# Patient Record
Sex: MALE | Race: BLACK OR AFRICAN AMERICAN | NOT HISPANIC OR LATINO
[De-identification: names, ages, dates, MRNs, and addresses within clinical notes are randomized per-mention and may not be internally consistent; named-entity substitution may affect disease eponyms.]

---

## 2017-03-30 ENCOUNTER — APPOINTMENT (OUTPATIENT)
Dept: HEART AND VASCULAR | Facility: CLINIC | Age: 67
End: 2017-03-30

## 2017-03-30 PROBLEM — Z00.00 ENCOUNTER FOR PREVENTIVE HEALTH EXAMINATION: Status: ACTIVE | Noted: 2017-03-30

## 2019-10-22 ENCOUNTER — APPOINTMENT (OUTPATIENT)
Dept: VASCULAR SURGERY | Facility: CLINIC | Age: 69
End: 2019-10-22
Payer: MEDICARE

## 2019-10-22 DIAGNOSIS — Z78.9 OTHER SPECIFIED HEALTH STATUS: ICD-10-CM

## 2019-10-22 DIAGNOSIS — Z87.891 PERSONAL HISTORY OF NICOTINE DEPENDENCE: ICD-10-CM

## 2019-10-22 DIAGNOSIS — I10 ESSENTIAL (PRIMARY) HYPERTENSION: ICD-10-CM

## 2019-10-22 DIAGNOSIS — K21.9 GASTRO-ESOPHAGEAL REFLUX DISEASE W/OUT ESOPHAGITIS: ICD-10-CM

## 2019-10-22 DIAGNOSIS — E11.21 TYPE 2 DIABETES MELLITUS WITH DIABETIC NEPHROPATHY: ICD-10-CM

## 2019-10-22 PROCEDURE — G0365: CPT

## 2019-10-22 PROCEDURE — 99204 OFFICE O/P NEW MOD 45 MIN: CPT

## 2019-10-23 PROBLEM — E11.21 TYPE 2 DIABETES WITH NEPHROPATHY: Status: RESOLVED | Noted: 2019-10-23 | Resolved: 2019-10-23

## 2019-10-23 PROBLEM — K21.9 CHRONIC GERD: Status: RESOLVED | Noted: 2019-10-23 | Resolved: 2019-10-23

## 2019-10-23 PROBLEM — I10 ESSENTIAL HYPERTENSION WITH GOAL BLOOD PRESSURE LESS THAN 130/80: Status: RESOLVED | Noted: 2019-10-23 | Resolved: 2019-10-23

## 2019-10-23 PROBLEM — Z87.891 FORMER SMOKER: Status: ACTIVE | Noted: 2019-10-23

## 2019-10-23 PROBLEM — Z78.9 ALCOHOL INGESTION: Status: ACTIVE | Noted: 2019-10-23

## 2019-10-23 RX ORDER — SITAGLIPTIN 50 MG/1
50 TABLET, FILM COATED ORAL
Refills: 0 | Status: ACTIVE | COMMUNITY

## 2019-10-23 RX ORDER — SODIUM BICARBONATE
POWDER (GRAM) MISCELLANEOUS
Refills: 0 | Status: ACTIVE | COMMUNITY

## 2019-10-23 RX ORDER — RANITIDINE 150 MG/1
150 TABLET ORAL
Refills: 0 | Status: ACTIVE | COMMUNITY

## 2019-10-23 RX ORDER — CALCIUM ACETATE 667 MG/1
667 TABLET ORAL
Refills: 0 | Status: ACTIVE | COMMUNITY

## 2019-10-23 RX ORDER — FUROSEMIDE 80 MG/1
80 TABLET ORAL
Refills: 0 | Status: ACTIVE | COMMUNITY

## 2019-10-23 RX ORDER — AMLODIPINE BESYLATE 10 MG/1
10 TABLET ORAL
Refills: 0 | Status: ACTIVE | COMMUNITY

## 2019-10-23 RX ORDER — ASPIRIN 81 MG
81 TABLET, DELAYED RELEASE (ENTERIC COATED) ORAL
Refills: 0 | Status: ACTIVE | COMMUNITY

## 2019-10-23 RX ORDER — LISINOPRIL 40 MG/1
40 TABLET ORAL
Refills: 0 | Status: ACTIVE | COMMUNITY

## 2019-10-23 RX ORDER — CARVEDILOL 25 MG/1
25 TABLET, FILM COATED ORAL
Refills: 0 | Status: ACTIVE | COMMUNITY

## 2019-10-29 NOTE — HISTORY OF PRESENT ILLNESS
[FreeTextEntry1] : 69yoM w/h/o HTN/Type II DM w/CRI, in need of permanent AV access for imminent HD.  Pt is RHD, denies any injuries w/his UEs b/l.  Currently, he states his GFR is at 15, he states that Dr. 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

## 2019-10-29 NOTE — PHYSICAL EXAM
[Normal Thyroid] : the thyroid was normal [Normal Breath Sounds] : Normal breath sounds [Normal Heart Sounds] : normal heart sounds [Normal Rate and Rhythm] : normal rate and rhythm [2+] : left 2+ [1+] : left 1+ [No HSM] : no hepatosplenomegaly [Alert] : alert [Calm] : calm [No Rash or Lesion] : No rash or lesion [JVD] : no jugular venous distention  [Carotid Bruits] : no carotid bruits [Right Carotid Bruit] : no bruit heard over the right carotid [Left Carotid Bruit] : no bruit heard over the left carotid [Ankle Swelling (On Exam)] : not present [Varicose Veins Of Lower Extremities] : not present [] : not present [Abdomen Masses] : No abdominal masses [Abdomen Tenderness] : ~T ~M No abdominal tenderness [de-identified] : Well-nourished, NAD [de-identified] : NC/AT, anicteric [de-identified] : FROM throughout, strength 5/5 in UEs

## 2019-10-29 NOTE — ASSESSMENT
[FreeTextEntry1] : 69yoM w/h/o HTN/Type II DM w/CRI, in need of permanent AV access for imminent HD.  Pt is RHD, denies any injuries w/his UEs b/l.  Venous mapping demonstrates borderline cephalic vein for AVF creation, measures 2.3-2.7mm in LUE; radial artery 2.7mm, 47cm/sec flow, brachial artery 4.3mm, 44cm/sec.  Currently, he states his GFR is at 15, will plan for L radiocephalic AVF creation in a few weeks.\par \par I personally discussed this patient with the Physician Assistant at the time of the visit. I agree with the assessment and plan as written

## 2019-10-29 NOTE — PROCEDURE
[FreeTextEntry1] : Venous mapping demonstrates borderline cephalic vein for AVF creation, measures 2.3-2.7mm in LUE; radial artery 2.7mm, 47cm/sec flow, brachial artery 4.3mm, 44cm/sec.

## 2019-10-29 NOTE — REASON FOR VISIT
[Consultation] : a consultation visit [FreeTextEntry1] : CRI, in need of permanent AV access for imminent HD

## 2019-11-19 NOTE — ASU PATIENT PROFILE, ADULT - PMH
CRI (chronic renal insufficiency)    DM (diabetes mellitus), type 2    GERD (gastroesophageal reflux disease)    HTN (hypertension)

## 2019-11-19 NOTE — ASU PATIENT PROFILE, ADULT - TEACHING/LEARNING FACTORS INFLUENCE READINESS TO LEARN
Met with patient,  and family at bedside about current living arrangements. Pt. Was discharged from [facility] on Thursday. Pt. Lives [place]  in [place] 2 bedroom apartments.  Pt. Has \"wake up\" service as well as medication none

## 2019-12-03 VITALS
HEIGHT: 71 IN | TEMPERATURE: 98 F | HEART RATE: 63 BPM | RESPIRATION RATE: 16 BRPM | WEIGHT: 219.8 LBS | SYSTOLIC BLOOD PRESSURE: 174 MMHG | DIASTOLIC BLOOD PRESSURE: 78 MMHG

## 2019-12-04 ENCOUNTER — APPOINTMENT (OUTPATIENT)
Dept: VASCULAR SURGERY | Facility: HOSPITAL | Age: 69
End: 2019-12-04

## 2019-12-04 ENCOUNTER — OUTPATIENT (OUTPATIENT)
Dept: OUTPATIENT SERVICES | Facility: HOSPITAL | Age: 69
LOS: 1 days | Discharge: HOME | End: 2019-12-04
Payer: MEDICARE

## 2019-12-04 VITALS
RESPIRATION RATE: 16 BRPM | TEMPERATURE: 98 F | DIASTOLIC BLOOD PRESSURE: 82 MMHG | HEART RATE: 56 BPM | SYSTOLIC BLOOD PRESSURE: 160 MMHG | OXYGEN SATURATION: 100 %

## 2019-12-04 DIAGNOSIS — Z90.49 ACQUIRED ABSENCE OF OTHER SPECIFIED PARTS OF DIGESTIVE TRACT: Chronic | ICD-10-CM

## 2019-12-04 LAB
GLUCOSE BLDC GLUCOMTR-MCNC: 120 MG/DL — HIGH (ref 70–99)
POTASSIUM BLDV-SCNC: 3 MMOL/L — LOW (ref 3.5–4.9)

## 2019-12-04 PROCEDURE — 36821 AV FUSION DIRECT ANY SITE: CPT | Mod: GC

## 2019-12-04 PROCEDURE — 84132 ASSAY OF SERUM POTASSIUM: CPT

## 2019-12-04 PROCEDURE — 82962 GLUCOSE BLOOD TEST: CPT

## 2019-12-04 PROCEDURE — 36821 AV FUSION DIRECT ANY SITE: CPT

## 2019-12-04 RX ORDER — HYDRALAZINE HCL 50 MG
5 TABLET ORAL ONCE
Refills: 0 | Status: COMPLETED | OUTPATIENT
Start: 2019-12-04 | End: 2019-12-04

## 2019-12-04 RX ADMIN — Medication 5 MILLIGRAM(S): at 12:28

## 2019-12-04 NOTE — BRIEF OPERATIVE NOTE - OPERATION/FINDINGS
Creation of left radiocephalic AV fistula at wrist using 3 mm artery and vein. Distal venous side branch ligated. Palpable thrill and radial pulse upon completion.

## 2019-12-04 NOTE — PACU DISCHARGE NOTE - COMMENTS
Pt met PACU criteria for discharge home. Ambulated safely, did not want anything to eat. Reviewed discussed discharge instructions and medication reconciliation with patient. Instructed on signs and symptoms to notify his doctor, when to remove dressing and when to resume his regular medications. Pt verbalized understanding of all the above teachings

## 2019-12-10 PROBLEM — K21.9 GASTRO-ESOPHAGEAL REFLUX DISEASE WITHOUT ESOPHAGITIS: Chronic | Status: ACTIVE | Noted: 2019-11-19

## 2019-12-10 PROBLEM — E11.9 TYPE 2 DIABETES MELLITUS WITHOUT COMPLICATIONS: Chronic | Status: ACTIVE | Noted: 2019-11-19

## 2019-12-10 PROBLEM — N18.9 CHRONIC KIDNEY DISEASE, UNSPECIFIED: Chronic | Status: ACTIVE | Noted: 2019-11-19

## 2019-12-10 PROBLEM — I10 ESSENTIAL (PRIMARY) HYPERTENSION: Chronic | Status: ACTIVE | Noted: 2019-11-19

## 2019-12-17 ENCOUNTER — APPOINTMENT (OUTPATIENT)
Dept: VASCULAR SURGERY | Facility: CLINIC | Age: 69
End: 2019-12-17
Payer: MEDICARE

## 2019-12-17 DIAGNOSIS — N18.4 CHRONIC KIDNEY DISEASE, STAGE 4 (SEVERE): ICD-10-CM

## 2019-12-17 PROCEDURE — 99024 POSTOP FOLLOW-UP VISIT: CPT

## 2019-12-19 NOTE — PHYSICAL EXAM
[Normal Thyroid] : the thyroid was normal [Normal Breath Sounds] : Normal breath sounds [Normal Heart Sounds] : normal heart sounds [Normal Rate and Rhythm] : normal rate and rhythm [2+] : right 2+ [1+] : left 1+ [No HSM] : no hepatosplenomegaly [No Rash or Lesion] : No rash or lesion [Alert] : alert [Calm] : calm [JVD] : no jugular venous distention  [Carotid Bruits] : no carotid bruits [Right Carotid Bruit] : no bruit heard over the right carotid [Left Carotid Bruit] : no bruit heard over the left carotid [Ankle Swelling (On Exam)] : not present [] : not present [Varicose Veins Of Lower Extremities] : not present [Abdomen Masses] : No abdominal masses [Abdomen Tenderness] : ~T ~M No abdominal tenderness [de-identified] : Well-nourished, NAD [FreeTextEntry1] : +strong thrill/bruit and growing vein in the forearm [de-identified] : NC/AT, anicteric [de-identified] : FROM throughout, strength 5/5 in UEs, thenar prominence equal in bulk b/l [de-identified] : Neurosensory grossly intact in UE b/l

## 2019-12-19 NOTE — DISCUSSION/SUMMARY
[FreeTextEntry1] : Pt s/p L radiocephalic AVF creation for CKD, not yet requiring HD.  Pt reports no symptoms of pain or weakness in the LUE, but does report some numbness over the thenar prominence.  Encouraged pt to continue to exercise the hand and wrist, and reassured pt that the numbness in the hand will resolve w/time.  Pt will return for clearance for use of the AVF when required.\par \par I personally discussed this patient with the Physician Assistant at the time of the visit. I agree with the assessment and plan as written

## 2019-12-19 NOTE — REASON FOR VISIT
[de-identified] : Creation of L radiocephalic AVF [de-identified] : 12/04/2019 [de-identified] : 2 [de-identified] : 13 [de-identified] : Pt s/p L radiocephalic AVF creation for CKD, not yet requiring HD.  Pt reports no symptoms of pain or weakness in the LUE, but does report some numbness over the thenar prominence.  Post-op pain 0/10.

## 2020-06-29 ENCOUNTER — LABORATORY RESULT (OUTPATIENT)
Age: 70
End: 2020-06-29

## 2020-07-06 ENCOUNTER — OUTPATIENT (OUTPATIENT)
Dept: OUTPATIENT SERVICES | Facility: HOSPITAL | Age: 70
LOS: 1 days | End: 2020-07-06
Payer: MEDICARE

## 2020-07-06 ENCOUNTER — APPOINTMENT (OUTPATIENT)
Dept: INTERVENTIONAL RADIOLOGY/VASCULAR | Facility: HOSPITAL | Age: 70
End: 2020-07-06

## 2020-07-06 DIAGNOSIS — Z90.49 ACQUIRED ABSENCE OF OTHER SPECIFIED PARTS OF DIGESTIVE TRACT: Chronic | ICD-10-CM

## 2020-07-06 PROCEDURE — 99152 MOD SED SAME PHYS/QHP 5/>YRS: CPT

## 2020-07-06 PROCEDURE — 36902 INTRO CATH DIALYSIS CIRCUIT: CPT

## 2020-07-06 PROCEDURE — 99153 MOD SED SAME PHYS/QHP EA: CPT

## 2020-07-06 PROCEDURE — C1769: CPT

## 2020-07-06 PROCEDURE — C1894: CPT

## 2020-07-06 PROCEDURE — C1887: CPT

## 2020-07-06 PROCEDURE — C1725: CPT

## 2020-08-20 PROBLEM — N18.4 CHRONIC RENAL INSUFFICIENCY, STAGE IV (SEVERE): Status: ACTIVE | Noted: 2019-10-23

## 2020-08-31 ENCOUNTER — APPOINTMENT (OUTPATIENT)
Dept: INTERVENTIONAL RADIOLOGY/VASCULAR | Facility: HOSPITAL | Age: 70
End: 2020-08-31

## 2023-09-13 ENCOUNTER — APPOINTMENT (OUTPATIENT)
Dept: INTERVENTIONAL RADIOLOGY/VASCULAR | Facility: HOSPITAL | Age: 73
End: 2023-09-13

## 2023-09-13 ENCOUNTER — OUTPATIENT (OUTPATIENT)
Dept: OUTPATIENT SERVICES | Facility: HOSPITAL | Age: 73
LOS: 1 days | End: 2023-09-13
Payer: MEDICARE

## 2023-09-13 DIAGNOSIS — Z90.49 ACQUIRED ABSENCE OF OTHER SPECIFIED PARTS OF DIGESTIVE TRACT: Chronic | ICD-10-CM
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
## 2023-09-13 DIAGNOSIS — T82.858A STENOSIS OF OTHER VASCULAR PROSTHETIC DEVICES, IMPLANTS AND GRAFTS, INITIAL ENCOUNTER: ICD-10-CM

## 2023-09-13 DIAGNOSIS — T82.868A THROMBOSIS DUE TO VASCULAR PROSTHETIC DEVICES, IMPLANTS AND GRAFTS, INITIAL ENCOUNTER: ICD-10-CM

## 2023-09-13 PROCEDURE — C1887: CPT

## 2023-09-13 PROCEDURE — C1769: CPT

## 2023-09-13 PROCEDURE — 36905 THRMBC/NFS DIALYSIS CIRCUIT: CPT

## 2023-09-13 PROCEDURE — C1757: CPT

## 2023-09-13 PROCEDURE — C1894: CPT

## 2023-09-13 PROCEDURE — C1725: CPT

## 2023-12-13 ENCOUNTER — APPOINTMENT (OUTPATIENT)
Dept: INTERVENTIONAL RADIOLOGY/VASCULAR | Facility: HOSPITAL | Age: 73
End: 2023-12-13